# Patient Record
Sex: FEMALE | Race: WHITE | Employment: OTHER | URBAN - METROPOLITAN AREA
[De-identification: names, ages, dates, MRNs, and addresses within clinical notes are randomized per-mention and may not be internally consistent; named-entity substitution may affect disease eponyms.]

---

## 2017-03-24 PROBLEM — R20.0 ARM NUMBNESS: Status: ACTIVE | Noted: 2017-03-24

## 2020-03-06 ENCOUNTER — APPOINTMENT (OUTPATIENT)
Dept: CT IMAGING | Age: 55
End: 2020-03-06
Payer: COMMERCIAL

## 2020-03-06 ENCOUNTER — HOSPITAL ENCOUNTER (EMERGENCY)
Age: 55
Discharge: HOME | End: 2020-03-06
Attending: EMERGENCY MEDICINE
Payer: COMMERCIAL

## 2020-03-06 ENCOUNTER — APPOINTMENT (OUTPATIENT)
Dept: GENERAL RADIOLOGY | Age: 55
End: 2020-03-06
Payer: COMMERCIAL

## 2020-03-06 ENCOUNTER — APPOINTMENT (OUTPATIENT)
Dept: NUCLEAR MEDICINE | Age: 55
End: 2020-03-06
Payer: COMMERCIAL

## 2020-03-06 ENCOUNTER — APPOINTMENT (OUTPATIENT)
Dept: NON INVASIVE DIAGNOSTICS | Age: 55
End: 2020-03-06
Payer: COMMERCIAL

## 2020-03-06 VITALS
TEMPERATURE: 97.9 F | DIASTOLIC BLOOD PRESSURE: 70 MMHG | SYSTOLIC BLOOD PRESSURE: 113 MMHG | HEIGHT: 61 IN | HEART RATE: 70 BPM | RESPIRATION RATE: 16 BRPM | OXYGEN SATURATION: 95 % | WEIGHT: 140 LBS | BODY MASS INDEX: 26.43 KG/M2

## 2020-03-06 LAB
ALBUMIN SERPL-MCNC: 4.3 G/DL (ref 3.5–5.2)
ALP BLD-CCNC: 74 U/L (ref 35–104)
ALT SERPL-CCNC: 31 U/L (ref 0–32)
ANION GAP SERPL CALCULATED.3IONS-SCNC: 12 MMOL/L (ref 7–16)
ANISOCYTOSIS: NORMAL
AST SERPL-CCNC: 23 U/L (ref 0–31)
ATYPICAL LYMPHOCYTE RELATIVE PERCENT: 2 % (ref 0–4)
BASOPHILS ABSOLUTE: 0 E9/L (ref 0–0.2)
BASOPHILS RELATIVE PERCENT: 0 % (ref 0–2)
BILIRUB SERPL-MCNC: 0.3 MG/DL (ref 0–1.2)
BUN BLDV-MCNC: 15 MG/DL (ref 6–20)
CALCIUM SERPL-MCNC: 9 MG/DL (ref 8.6–10.2)
CHLORIDE BLD-SCNC: 103 MMOL/L (ref 98–107)
CHOLESTEROL, TOTAL: 140 MG/DL (ref 0–199)
CO2: 25 MMOL/L (ref 22–29)
CREAT SERPL-MCNC: 0.7 MG/DL (ref 0.5–1)
D DIMER: 777 NG/ML DDU
EKG ATRIAL RATE: 59 BPM
EKG ATRIAL RATE: 64 BPM
EKG P AXIS: 41 DEGREES
EKG P AXIS: 53 DEGREES
EKG P-R INTERVAL: 122 MS
EKG P-R INTERVAL: 126 MS
EKG Q-T INTERVAL: 414 MS
EKG Q-T INTERVAL: 430 MS
EKG QRS DURATION: 84 MS
EKG QRS DURATION: 92 MS
EKG QTC CALCULATION (BAZETT): 425 MS
EKG QTC CALCULATION (BAZETT): 427 MS
EKG R AXIS: 25 DEGREES
EKG R AXIS: 49 DEGREES
EKG T AXIS: 11 DEGREES
EKG T AXIS: 32 DEGREES
EKG VENTRICULAR RATE: 59 BPM
EKG VENTRICULAR RATE: 64 BPM
EOSINOPHILS ABSOLUTE: 0.31 E9/L (ref 0.05–0.5)
EOSINOPHILS RELATIVE PERCENT: 5 % (ref 0–6)
GFR AFRICAN AMERICAN: >60
GFR NON-AFRICAN AMERICAN: >60 ML/MIN/1.73
GLUCOSE BLD-MCNC: 118 MG/DL (ref 74–99)
HCT VFR BLD CALC: 35.2 % (ref 34–48)
HDLC SERPL-MCNC: 51 MG/DL
HEMOGLOBIN: 11.6 G/DL (ref 11.5–15.5)
LDL CHOLESTEROL CALCULATED: 70 MG/DL (ref 0–99)
LV EF: 80 %
LVEF MODALITY: NORMAL
LYMPHOCYTES ABSOLUTE: 1.8 E9/L (ref 1.5–4)
LYMPHOCYTES RELATIVE PERCENT: 27 % (ref 20–42)
MCH RBC QN AUTO: 28.8 PG (ref 26–35)
MCHC RBC AUTO-ENTMCNC: 33 % (ref 32–34.5)
MCV RBC AUTO: 87.3 FL (ref 80–99.9)
METAMYELOCYTES RELATIVE PERCENT: 1 % (ref 0–1)
MONOCYTES ABSOLUTE: 0.37 E9/L (ref 0.1–0.95)
MONOCYTES RELATIVE PERCENT: 6 % (ref 2–12)
NEUTROPHILS ABSOLUTE: 3.72 E9/L (ref 1.8–7.3)
NEUTROPHILS RELATIVE PERCENT: 59 % (ref 43–80)
PDW BLD-RTO: 13.5 FL (ref 11.5–15)
PLATELET # BLD: 190 E9/L (ref 130–450)
PMV BLD AUTO: 11.3 FL (ref 7–12)
POTASSIUM REFLEX MAGNESIUM: 3.7 MMOL/L (ref 3.5–5)
RBC # BLD: 4.03 E12/L (ref 3.5–5.5)
SODIUM BLD-SCNC: 140 MMOL/L (ref 132–146)
TOTAL PROTEIN: 7.5 G/DL (ref 6.4–8.3)
TRIGL SERPL-MCNC: 96 MG/DL (ref 0–149)
TROPONIN: <0.01 NG/ML (ref 0–0.03)
TROPONIN: <0.01 NG/ML (ref 0–0.03)
VLDLC SERPL CALC-MCNC: 19 MG/DL
WBC # BLD: 6.2 E9/L (ref 4.5–11.5)

## 2020-03-06 PROCEDURE — 3430000000 HC RX DIAGNOSTIC RADIOPHARMACEUTICAL: Performed by: RADIOLOGY

## 2020-03-06 PROCEDURE — 84484 ASSAY OF TROPONIN QUANT: CPT

## 2020-03-06 PROCEDURE — A9500 TC99M SESTAMIBI: HCPCS | Performed by: RADIOLOGY

## 2020-03-06 PROCEDURE — 96375 TX/PRO/DX INJ NEW DRUG ADDON: CPT

## 2020-03-06 PROCEDURE — 93005 ELECTROCARDIOGRAM TRACING: CPT | Performed by: EMERGENCY MEDICINE

## 2020-03-06 PROCEDURE — 85025 COMPLETE CBC W/AUTO DIFF WBC: CPT

## 2020-03-06 PROCEDURE — 6360000002 HC RX W HCPCS: Performed by: EMERGENCY MEDICINE

## 2020-03-06 PROCEDURE — 85378 FIBRIN DEGRADE SEMIQUANT: CPT

## 2020-03-06 PROCEDURE — 93016 CV STRESS TEST SUPVJ ONLY: CPT | Performed by: INTERNAL MEDICINE

## 2020-03-06 PROCEDURE — 73030 X-RAY EXAM OF SHOULDER: CPT

## 2020-03-06 PROCEDURE — 71045 X-RAY EXAM CHEST 1 VIEW: CPT

## 2020-03-06 PROCEDURE — 99245 OFF/OP CONSLTJ NEW/EST HI 55: CPT | Performed by: INTERNAL MEDICINE

## 2020-03-06 PROCEDURE — 80053 COMPREHEN METABOLIC PANEL: CPT

## 2020-03-06 PROCEDURE — 78452 HT MUSCLE IMAGE SPECT MULT: CPT

## 2020-03-06 PROCEDURE — APPSS60 APP SPLIT SHARED TIME 46-60 MINUTES: Performed by: NURSE PRACTITIONER

## 2020-03-06 PROCEDURE — 99284 EMERGENCY DEPT VISIT MOD MDM: CPT

## 2020-03-06 PROCEDURE — 96374 THER/PROPH/DIAG INJ IV PUSH: CPT

## 2020-03-06 PROCEDURE — 80061 LIPID PANEL: CPT

## 2020-03-06 PROCEDURE — 71275 CT ANGIOGRAPHY CHEST: CPT

## 2020-03-06 PROCEDURE — 6360000004 HC RX CONTRAST MEDICATION: Performed by: RADIOLOGY

## 2020-03-06 PROCEDURE — 93018 CV STRESS TEST I&R ONLY: CPT | Performed by: INTERNAL MEDICINE

## 2020-03-06 PROCEDURE — 6370000000 HC RX 637 (ALT 250 FOR IP): Performed by: EMERGENCY MEDICINE

## 2020-03-06 PROCEDURE — 93017 CV STRESS TEST TRACING ONLY: CPT

## 2020-03-06 RX ORDER — ASPIRIN 325 MG
325 TABLET ORAL ONCE
Status: COMPLETED | OUTPATIENT
Start: 2020-03-06 | End: 2020-03-06

## 2020-03-06 RX ORDER — KETOROLAC TROMETHAMINE 30 MG/ML
15 INJECTION, SOLUTION INTRAMUSCULAR; INTRAVENOUS ONCE
Status: COMPLETED | OUTPATIENT
Start: 2020-03-06 | End: 2020-03-06

## 2020-03-06 RX ORDER — FENTANYL CITRATE 50 UG/ML
25 INJECTION, SOLUTION INTRAMUSCULAR; INTRAVENOUS ONCE
Status: COMPLETED | OUTPATIENT
Start: 2020-03-06 | End: 2020-03-06

## 2020-03-06 RX ADMIN — Medication 30 MILLICURIE: at 13:36

## 2020-03-06 RX ADMIN — FENTANYL CITRATE 25 MCG: 50 INJECTION, SOLUTION INTRAMUSCULAR; INTRAVENOUS at 07:05

## 2020-03-06 RX ADMIN — KETOROLAC TROMETHAMINE 15 MG: 30 INJECTION, SOLUTION INTRAMUSCULAR; INTRAVENOUS at 06:25

## 2020-03-06 RX ADMIN — IOPAMIDOL 80 ML: 755 INJECTION, SOLUTION INTRAVENOUS at 07:57

## 2020-03-06 RX ADMIN — ASPIRIN 325 MG: 325 TABLET, FILM COATED ORAL at 06:12

## 2020-03-06 RX ADMIN — Medication 10 MILLICURIE: at 13:36

## 2020-03-06 ASSESSMENT — PAIN SCALES - GENERAL
PAINLEVEL_OUTOF10: 5
PAINLEVEL_OUTOF10: 6
PAINLEVEL_OUTOF10: 7

## 2020-03-06 ASSESSMENT — PAIN DESCRIPTION - DESCRIPTORS: DESCRIPTORS: ACHING;SPASM;STABBING

## 2020-03-06 ASSESSMENT — PAIN DESCRIPTION - ONSET: ONSET: PROGRESSIVE

## 2020-03-06 ASSESSMENT — ENCOUNTER SYMPTOMS
WHEEZING: 0
EYE PAIN: 0
SORE THROAT: 0
ABDOMINAL PAIN: 0
VOMITING: 0
NAUSEA: 0
COUGH: 0
CHEST TIGHTNESS: 0
SHORTNESS OF BREATH: 0
CONSTIPATION: 0
COLOR CHANGE: 0
BACK PAIN: 0
DIARRHEA: 0

## 2020-03-06 ASSESSMENT — PAIN DESCRIPTION - PAIN TYPE: TYPE: ACUTE PAIN

## 2020-03-06 ASSESSMENT — PAIN DESCRIPTION - ORIENTATION: ORIENTATION: RIGHT

## 2020-03-06 ASSESSMENT — PAIN DESCRIPTION - LOCATION: LOCATION: SHOULDER

## 2020-03-06 ASSESSMENT — PAIN DESCRIPTION - FREQUENCY: FREQUENCY: CONTINUOUS

## 2020-03-06 NOTE — ED PROVIDER NOTES
Oropharynx is clear. Eyes:      General: Lids are normal.      Extraocular Movements: Extraocular movements intact. Conjunctiva/sclera: Conjunctivae normal.   Neck:      Musculoskeletal: Normal range of motion and neck supple. Trachea: Trachea and phonation normal.   Cardiovascular:      Rate and Rhythm: Normal rate and regular rhythm. Heart sounds: Normal heart sounds. Pulmonary:      Effort: Pulmonary effort is normal.      Breath sounds: Normal breath sounds and air entry. Abdominal:      General: Abdomen is flat. Palpations: Abdomen is soft. Tenderness: There is no abdominal tenderness. There is no guarding or rebound. Musculoskeletal:      Right shoulder: She exhibits pain. Comments: Patient is complaining of right-sided shoulder and back pain, however does not reproducible to palpation. Skin:     General: Skin is warm and dry. Neurological:      General: No focal deficit present. Mental Status: She is alert and oriented to person, place, and time. GCS: GCS eye subscore is 4. GCS verbal subscore is 5. GCS motor subscore is 6. Psychiatric:         Attention and Perception: Attention and perception normal.         Mood and Affect: Mood and affect normal.         Speech: Speech normal.         Behavior: Behavior normal. Behavior is cooperative. Thought Content: Thought content normal.         Cognition and Memory: Cognition and memory normal.          Procedures     MDM  Number of Diagnoses or Management Options  Acute pain of right shoulder:   Chest pain, unspecified type:   Treadmill stress test negative for angina pectoris:   Diagnosis management comments: Patient's lab work within normal limits, however patient's EKG has some new T wave inversions in lead V2 relative to her prior. Spoke to Haley Arteaga came to the ED and evaluated patient. Patient has negative delta troponin, patient had negative stress test on as well.   Patient discharged home Protein 7.5 6.4 - 8.3 g/dL    Alb 4.3 3.5 - 5.2 g/dL    Total Bilirubin 0.3 0.0 - 1.2 mg/dL    Alkaline Phosphatase 74 35 - 104 U/L    ALT 31 0 - 32 U/L    AST 23 0 - 31 U/L   Troponin   Result Value Ref Range    Troponin <0.01 0.00 - 0.03 ng/mL   D-Dimer, Quantitative   Result Value Ref Range    D-Dimer, Quant 777 ng/mL DDU   Troponin   Result Value Ref Range    Troponin <0.01 0.00 - 0.03 ng/mL   Lipid Panel   Result Value Ref Range    Cholesterol, Total 140 0 - 199 mg/dL    Triglycerides 96 0 - 149 mg/dL    HDL 51 >40 mg/dL    LDL Calculated 70 0 - 99 mg/dL    VLDL Cholesterol Calculated 19 mg/dL   EKG 12 Lead   Result Value Ref Range    Ventricular Rate 64 BPM    Atrial Rate 64 BPM    P-R Interval 126 ms    QRS Duration 84 ms    Q-T Interval 414 ms    QTc Calculation (Bazett) 427 ms    P Axis 53 degrees    R Axis 49 degrees    T Axis 32 degrees   EKG 12 Lead   Result Value Ref Range    Ventricular Rate 59 BPM    Atrial Rate 59 BPM    P-R Interval 122 ms    QRS Duration 92 ms    Q-T Interval 430 ms    QTc Calculation (Bazett) 425 ms    P Axis 41 degrees    R Axis 25 degrees    T Axis 11 degrees       Radiology:  NM Cardiac Stress Test Nuclear Imaging   Final Result   1. No reversible perfusion defect   2. Ejection fraction is 80 %. 3. No significant wall motion abnormality         XR CHEST PORTABLE   Final Result   Normal chest               XR SHOULDER RIGHT (MIN 2 VIEWS)   Final Result   Nondisplaced fracture right clavicle. CTA PULMONARY W CONTRAST   Final Result      1. Negative for PE or dissection. 2. Slight dependent atelectasis at the lung bases, no definite   pneumonia. 3. Mid thoracic degeneration.                   ------------------------- NURSING NOTES AND VITALS REVIEWED ---------------------------  Date / Time Roomed:  3/6/2020  5:17 AM  ED Bed Assignment:  21/21    The nursing notes within the ED encounter and vital signs as below have been reviewed.    /70   Pulse 70 Temp 97.9 °F (36.6 °C) (Oral)   Resp 16   Ht 5' 1\" (1.549 m)   Wt 140 lb (63.5 kg)   LMP 09/08/2015   SpO2 95%   BMI 26.45 kg/m²   Oxygen Saturation Interpretation: Normal      ------------------------------------------ PROGRESS NOTES ------------------------------------------  3:15 PM  I have spoken with the patient and discussed todays results, in addition to providing specific details for the plan of care and counseling regarding the diagnosis and prognosis. Their questions are answered at this time and they are agreeable with the plan. I discussed at length with them reasons for immediate return here for re evaluation. They will followup with their primary care physician by calling their office on Monday.      --------------------------------- ADDITIONAL PROVIDER NOTES ---------------------------------  At this time the patient is without objective evidence of an acute process requiring hospitalization or inpatient management. They have remained hemodynamically stable throughout their entire ED visit and are stable for discharge with outpatient follow-up. The plan has been discussed in detail and they are aware of the specific conditions for emergent return, as well as the importance of follow-up. New Prescriptions    No medications on file       Diagnosis:  1. Acute pain of right shoulder    2. Chest pain, unspecified type    3. Treadmill stress test negative for angina pectoris        Disposition:  Patient's disposition: Discharge to home  Patient's condition is stable.        Prabhjot Bilsl DO  Resident  03/06/20 [id number]

## 2020-03-06 NOTE — CONSULTS
Inpatient Cardiology Consultation      Reason for Consult:  EKG changes     Consulting Physician: Dr. Srinivasan Herrera    Requesting Physician:  Dr. Dianne Lyman     Date of Consultation: 3/6/2020    HISTORY OF PRESENT ILLNESS:   Ms. Wilfredo Cruz is a 54year old  female who is previously not known to [facility]) Cardiology Physicians in [facility]. Her medical history includes HLD, right arm carpal tunnel, and family Hx of premature CAD. Ms. Wilfredo Cruz presented to [facility] ED on 03/06/2020 with complaints of right shoulder and upper mid back pain. She states that prior to presentation over the past 2 days she had a \"low grade fever and headache\". 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 She noticed a red rash on 03/05/2020 and went to see her PCP () and received a steroid injection. She states that her rash was gone by this morning. She states that on 03/05/2020 she was \"moving and lifting a lot\". She denies injury or fall. States that she went to bed without pain or discomfort. States that on 03/06/2020 she woke at 3:45 with severe right shoulder pain that was improved with ambulation and she denies accompanying chest pain and dyspnea. She states that her right shoulder pain persisted and worsened with lying down and sitting. Subsequently she presented to the ED for further evaluation. Upon arrival to the ED her VS were 98.3-82-/72-97% on RA. EKG SR with nonspecific ST-T wave abnormality (as interpreted by Dr. Srinivasan Herrera). Troponin <0.01. BC 6.2. H&H 11.6/35.2. BUN/SCr 15/0.7. CTA of the chest was negative for pulmonary emboli, with atelectasis and mid thoracic degeneration. XRay of the right shoulder with a nondisplaced fracture right clavicle. She states that her right shoulder pain began to radiate to her upper mid back. She received ASA 324mg, Toradol 15mg and Fentanyl 25mcg.  She states that her right shoulder pain was alleviated and her back pain improved. Repeat EKG with SR and no acute changes. Cardiology was consulted by Dr. Darryl Joseph for management of EKG changes. Please note: past medical records were reviewed per electronic medical record (EMR) - see detailed reports under Past Medical/ Surgical History. Past Medical and Surgical History:    1. HLD  2. Right Carpal Tunnel  3. Family Hx of Premature CAD  4. S/p Nasal septum surgery and tracheostomy at age 7 months. Medications Prior to admit:  Prior to Admission medications    Medication Sig Start Date End Date Taking? Authorizing Provider   vitamin D (CHOLECALCIFEROL) 1000 UNIT TABS tablet Take 1,000 Units by mouth daily   Yes Historical Provider, MD   atorvastatin (LIPITOR) 10 MG tablet Take 10 mg by mouth daily. Yes Historical Provider, MD   Calcium-Vitamin D-Vitamin K (VIACTIV PO) Take  by mouth. Yes Historical Provider, MD       Current Medications:    No current facility-administered medications for this encounter. Allergies:  Cephalosporins; Penicillins; and Sulfa antibiotics    Social History:    Denies tobacco and illicit drug use. Drinks alcohol on rare occasions. Denies caffeine intake     Family History:   Father with initial MI at 48. Mother s/p CABG/VHD (specifics unknown) at age 58. REVIEW OF SYSTEMS:     · Constitutional: Denies chills, night sweats, and fatigue. Complains of low grade fevers  · HEENT: Denies nose bleeds, and blurred vision,oral pain, abscess or lesion. Complains of headaches   · Musculoskeletal: Denies falls, pain to BLE with ambulation and edema to BLE. Complains of right shoulder and mid back pain. · Neurological: Denies dizziness and lightheadedness, numbness and tingling  · Cardiovascular: Denies chest pain, palpitations, and feelings of heart racing. · Respiratory: Denies orthopnea and PND  · Gastrointestinal: Denies heartburn, nausea/vomiting, diarrhea and constipation, black/bloody, and tarry stools. 190     BMP:   Recent Labs     03/06/20 0618      K 3.7   CO2 25   BUN 15   CREATININE 0.7   LABGLOM >60   CALCIUM 9.0   CARDIAC ENZYMES:  Recent Labs     03/06/20 0618   TROPONINI <0.01   LIVER PROFILE:  Recent Labs     03/06/20 0618   AST 23   ALT 31   LABALBU 4.3     03/06/2020 CTA of Chest:   1. Negative for PE or dissection. 2. Slight dependent atelectasis at the lung bases, no definite  pneumonia. 3. Mid thoracic degeneration.     03/06/2020 CXR:   Normal chest    03/06/2020 Right Shoulder XRay:   Nondisplaced fracture right clavicle. IMPRESSION and PLAN to follow as per Dr. Olvin Sadler    Electronically signed by REX Charles CNP on 3/6/2020 at 10:07 AM   ______________________________________________________________________  Cardiology attending attestation:  I have independently interviewed and examined the patient. I personally reviewed pertinent laboratory values and diagnostic testing (including, if applicable, chest xray, electrocardiogram, telemetry, echocardiogram, stress testing, and coronary angiography). I have reviewed the above documentation completed by REX Charles CNP including past medical, social, and family history and agree with the findings, assessment and plan except where noted. Plan formulated under my direct supervision. I participated in all aspects of the medical decision making. Please see my additional contributions to the HPI, physical exam, and assessment / medical decision making:  _______________________________________________________________________    Patient with no prior cardiac disease, but has family history of premature CAD and hyperlipidemia presenting with atypical right shoulder and arm pain, after moving some heavy stuff around the house all day yesterday. Initially was sharp and felt throbbing and radiating up and down the right arm. Also some radiation to the back right shoulder. EKG showed nonspecific anterior T wave inversions. Troponin negative x2. Currently complaining of some residual right back shoulder pain and little bit of right arm pain. Shoulder x-ray suggested a nondisplaced right clavicular fracture. She is normally active and tries to get 10,000 steps in per day. 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 No prior cardiac ischemic evaluation. Physical Exam:  /80   Pulse 57   Temp 98.3 °F (36.8 °C) (Oral)   Resp 14   Ht 5' 1\" (1.549 m)   Wt 140 lb (63.5 kg)   LMP 09/08/2015   SpO2 95%   BMI 26.45 kg/m²   General appearance: Well-appearing middle-aged female, awake, alert, no acute respiratory distress  Skin: Intact, no rash  ENMT: Moist mucous membranes  Neck: Supple, no carotid bruits. Normal jugular venous pressure  Lungs: Clear to auscultation bilaterally. No wheezes, rales, or rhonchi  Cardiac: Regular rhythm with a normal rate, normal S1&S2, no murmurs apparent  Abdomen: Soft, positive bowel sounds, nontender  Extremities: Moves all extremities x 4, no lower extremity edema  Neurologic: No focal motor deficits apparent, normal mood and affect    EKG: Sinus bradycardia 59 bpm.  Normal axis normal intervals. Nonspecific T wave inversions noted inferior and V2 to V3. Review of prior EKG from  January 2020 showed nonspecific T wave changes in V2 but appear more pronounced now. Impression:   1. Typical chest pain with nonspecific anterior T wave changes. Troponin negative x2. Patient with multiple cardiac risk factors. 2. Hyperlipidemia  3. Family history premature CAD  4. Comorbid disease: Possible nondisplaced right clavicular fracture, right carpal tunnel, history of nasal septal surgery and tracheostomy at 10months of age    Recommendations:  Low suspicion for ischemic etiology of her pain, but given her risk factors along with a nonspecific EKG changes I do think ischemic evaluation is warranted.      Exercise nuclear stress test today   If no significant ischemia, okay for discharge   Aggressive risk factor modification   I can follow-up with her in the office    Thank you for the consultation. Please do not hesitate to call with questions.     Karen Roldan MD  [facility]) Cardiology

## 2020-03-06 NOTE — ED NOTES
Bed: 21  Expected date:   Expected time:   Means of arrival:   Comments:  kiara Marte RN  03/06/20 [id number]

## 2020-03-10 ENCOUNTER — TELEPHONE (OUTPATIENT)
Dept: CARDIOLOGY CLINIC | Age: 55
End: 2020-03-10

## 2020-07-09 ENCOUNTER — HOSPITAL ENCOUNTER (EMERGENCY)
Age: 55
Discharge: HOME | End: 2020-07-09
Payer: COMMERCIAL

## 2020-07-09 VITALS
BODY MASS INDEX: 26.06 KG/M2 | DIASTOLIC BLOOD PRESSURE: 69 MMHG | TEMPERATURE: 98.2 F | HEART RATE: 75 BPM | RESPIRATION RATE: 14 BRPM | WEIGHT: 138 LBS | SYSTOLIC BLOOD PRESSURE: 137 MMHG | OXYGEN SATURATION: 97 % | HEIGHT: 61 IN

## 2020-07-09 PROCEDURE — 12002 RPR S/N/AX/GEN/TRNK2.6-7.5CM: CPT

## 2020-07-09 PROCEDURE — 99282 EMERGENCY DEPT VISIT SF MDM: CPT

## 2020-07-09 RX ORDER — LIDOCAINE HYDROCHLORIDE 10 MG/ML
20 INJECTION, SOLUTION INFILTRATION; PERINEURAL ONCE
Status: DISCONTINUED | OUTPATIENT
Start: 2020-07-09 | End: 2020-07-09 | Stop reason: HOSPADM

## 2020-07-09 ASSESSMENT — PAIN DESCRIPTION - LOCATION: LOCATION: ARM

## 2020-07-09 ASSESSMENT — PAIN DESCRIPTION - PROGRESSION: CLINICAL_PROGRESSION: NOT CHANGED

## 2020-07-09 ASSESSMENT — PAIN SCALES - GENERAL: PAINLEVEL_OUTOF10: 3

## 2020-07-09 ASSESSMENT — PAIN DESCRIPTION - FREQUENCY: FREQUENCY: CONTINUOUS

## 2020-07-09 ASSESSMENT — PAIN DESCRIPTION - PAIN TYPE: TYPE: ACUTE PAIN

## 2020-07-09 ASSESSMENT — PAIN DESCRIPTION - DESCRIPTORS: DESCRIPTORS: PATIENT UNABLE TO DESCRIBE

## 2020-07-09 ASSESSMENT — PAIN DESCRIPTION - ORIENTATION: ORIENTATION: LEFT

## 2020-07-09 NOTE — ED PROVIDER NOTES
EOMI   Neck:  Normal ROM. Supple. Non-tender. Extremities: 4cm laceration with some exposed adipose tissue to anterior left arm- hid humerus, no active bleeding, intact sensations distally, full ROM of affected extremity    Lymphatics: No lymphangitis or adenopathy noted. Neurological: CN II-XII grossly intact, Motor functions intact. Lab / Imaging Results   (All laboratory and radiology results have been personally reviewed by myself)  Labs:  No results found for this visit on 07/09/20. Imaging: All Radiology results interpreted by Radiologist unless otherwise noted. No orders to display     ED Course / Medical Decision Making     Medications   Tetanus-Diphth-Acell Pertussis (BOOSTRIX) injection 0.5 mL (has no administration in time range)   lidocaine 1 % injection 20 mL (has no administration in time range)     Consult(s):  None    Procedure(s):  PROCEDURE NOTE      LACERATION REPAIR  Risks, benefits and alternatives (for applicable procedures below) described. Performed By: Dedirck Franklin PA-C. Laceration #: 1. Location: anterior mid-humerus of left arm   Length: 4cm. The wound area was irrigated with sterile saline, cleansed with povidone iodine, cleansend with shur-clens and draped in a sterile fashion. Local Anesthesia:  Lidocaine 1% without epinephrine. The wound was explored with the following results:  no foreign body or tendon injury seen. Flaps Aligned: yes. The wound was closed with 5-0 Ethilon using interrupted sutures. Dressing:  a sterile dressing and a bandage was placed. Total number suture:  9. There were no additional lacerations requiring repair. MDM:     Counseling: The emergency provider has spoken with the patient/caregiver and discussed todays results, in addition to providing specific details for the plan of care and counseling regarding the diagnosis and prognosis. Questions are answered at this time and they are agreeable with the plan.   Pt was educated on signs of infection and wound care. Pt was advised to return to ED if signs of infection or fever develop. Pt understands they must follow-up with PCP for suture removal and/or wound check. Pt remained nontoxic, afebrile, and A&O x3 during this ED visit. They agreed with plan of care, discharge, and importance of follow-up. Pt was in no distress at discharge. Vitals stable. All results reviewed with pt and all questions answered. Patient was educated on newly prescribed medication. Patient educated on wound care. Assessment     1. Arm laceration, left, initial encounter      Plan   Discharge to home  Patient condition is good    New Medications     Discharge Medication List as of 7/9/2020 12:23 PM        Electronically signed by Abby Villaseñor PA-C   DD: 7/9/20  **This report was transcribed using voice recognition software. Every effort was made to ensure accuracy; however, inadvertent computerized transcription errors may be present.     END OF ED PROVIDER NOTE        Abby Villaseñor PA-C  07/09/20 [id number]

## 2023-04-18 ENCOUNTER — OFFICE VISIT (OUTPATIENT)
Dept: OBGYN | Age: 58
End: 2023-04-18
Payer: COMMERCIAL

## 2023-04-18 VITALS
BODY MASS INDEX: 27.56 KG/M2 | WEIGHT: 146 LBS | SYSTOLIC BLOOD PRESSURE: 121 MMHG | HEIGHT: 61 IN | HEART RATE: 54 BPM | DIASTOLIC BLOOD PRESSURE: 75 MMHG

## 2023-04-18 DIAGNOSIS — Z12.31 ENCOUNTER FOR SCREENING MAMMOGRAM FOR MALIGNANT NEOPLASM OF BREAST: ICD-10-CM

## 2023-04-18 DIAGNOSIS — Z01.419 ENCOUNTER FOR ANNUAL ROUTINE GYNECOLOGICAL EXAMINATION: Primary | ICD-10-CM

## 2023-04-18 PROBLEM — R20.0 ARM NUMBNESS: Status: RESOLVED | Noted: 2017-03-24 | Resolved: 2023-04-18

## 2023-04-18 PROCEDURE — 99396 PREV VISIT EST AGE 40-64: CPT | Performed by: OBSTETRICS & GYNECOLOGY

## 2023-04-18 PROCEDURE — 99203 OFFICE O/P NEW LOW 30 MIN: CPT | Performed by: OBSTETRICS & GYNECOLOGY

## 2023-04-18 SDOH — ECONOMIC STABILITY: HOUSING INSECURITY
IN THE LAST 12 MONTHS, WAS THERE A TIME WHEN YOU DID NOT HAVE A STEADY PLACE TO SLEEP OR SLEPT IN A SHELTER (INCLUDING NOW)?: NO

## 2023-04-18 SDOH — ECONOMIC STABILITY: FOOD INSECURITY: WITHIN THE PAST 12 MONTHS, YOU WORRIED THAT YOUR FOOD WOULD RUN OUT BEFORE YOU GOT MONEY TO BUY MORE.: NEVER TRUE

## 2023-04-18 SDOH — ECONOMIC STABILITY: INCOME INSECURITY: HOW HARD IS IT FOR YOU TO PAY FOR THE VERY BASICS LIKE FOOD, HOUSING, MEDICAL CARE, AND HEATING?: NOT HARD AT ALL

## 2023-04-18 SDOH — ECONOMIC STABILITY: FOOD INSECURITY: WITHIN THE PAST 12 MONTHS, THE FOOD YOU BOUGHT JUST DIDN'T LAST AND YOU DIDN'T HAVE MONEY TO GET MORE.: NEVER TRUE

## 2023-04-18 ASSESSMENT — PATIENT HEALTH QUESTIONNAIRE - PHQ9
SUM OF ALL RESPONSES TO PHQ QUESTIONS 1-9: 0
1. LITTLE INTEREST OR PLEASURE IN DOING THINGS: 0
SUM OF ALL RESPONSES TO PHQ QUESTIONS 1-9: 0
2. FEELING DOWN, DEPRESSED OR HOPELESS: 0
SUM OF ALL RESPONSES TO PHQ QUESTIONS 1-9: 0
SUM OF ALL RESPONSES TO PHQ QUESTIONS 1-9: 0
SUM OF ALL RESPONSES TO PHQ9 QUESTIONS 1 & 2: 0

## 2023-04-18 ASSESSMENT — ENCOUNTER SYMPTOMS
VOMITING: 0
ABDOMINAL PAIN: 0
WHEEZING: 0
CONSTIPATION: 0
DIARRHEA: 0
COUGH: 0
NAUSEA: 0
BLOOD IN STOOL: 0
SHORTNESS OF BREATH: 0

## 2023-04-18 NOTE — PROGRESS NOTES
Np/annual.  Pt denies abnormal mammogram or pap. Pt denies pain or discomfort with intercourse. Pt had last mammogram 7/202022 wnl. Pt has hx of dense breasts.   Pt last pap was 2/9/2018 neg pap neg hpv
Mental Status: She is alert. Psychiatric:         Mood and Affect: Mood normal.        Zaynab Goncalves was seen today for new patient. Diagnoses and all orders for this visit:    Encounter for annual routine gynecological examination  -     PAP SMEAR    Encounter for screening mammogram for malignant neoplasm of breast  -     Cancel: HA DIGITAL SCREEN W OR WO CAD BILATERAL; Future  -     HA DIGITAL SCREEN W OR WO CAD BILATERAL; Future      She is up-to-date on all of her screenings. We did review when to call for PMB. Reviewed hot flashes night sweats and vaginal dryness. And options for management. Encouraged continued calcium vitamin D and weightbearing exercise.     Return in about 1 year (around 4/18/2024) for annual.     Delmis Mackenzie, DO

## 2023-04-19 LAB
HPV SAMPLE: NORMAL
SPECIMEN SOURCE: NORMAL

## 2023-04-27 LAB
HPV HR 12 DNA SPEC QL NAA+PROBE: NOT DETECTED
HPV SAMPLE: NORMAL
HPV16 DNA SPEC QL NAA+PROBE: NOT DETECTED
HPV16+18+H RISK 12 DNA CVX-IMP: NORMAL
HPV18 DNA SPEC QL NAA+PROBE: NOT DETECTED
SPECIMEN SOURCE: NORMAL

## 2024-07-23 ENCOUNTER — OFFICE VISIT (OUTPATIENT)
Dept: OBGYN | Age: 59
End: 2024-07-23
Payer: COMMERCIAL

## 2024-07-23 VITALS
BODY MASS INDEX: 27.56 KG/M2 | WEIGHT: 146 LBS | DIASTOLIC BLOOD PRESSURE: 75 MMHG | HEIGHT: 61 IN | SYSTOLIC BLOOD PRESSURE: 115 MMHG | HEART RATE: 59 BPM

## 2024-07-23 DIAGNOSIS — Z01.419 ENCOUNTER FOR ANNUAL ROUTINE GYNECOLOGICAL EXAMINATION: Primary | ICD-10-CM

## 2024-07-23 DIAGNOSIS — Z12.31 ENCOUNTER FOR SCREENING MAMMOGRAM FOR MALIGNANT NEOPLASM OF BREAST: ICD-10-CM

## 2024-07-23 PROCEDURE — 99396 PREV VISIT EST AGE 40-64: CPT | Performed by: OBSTETRICS & GYNECOLOGY

## 2024-07-23 PROCEDURE — 99213 OFFICE O/P EST LOW 20 MIN: CPT | Performed by: OBSTETRICS & GYNECOLOGY

## 2024-07-23 ASSESSMENT — ENCOUNTER SYMPTOMS
BLOOD IN STOOL: 0
ABDOMINAL PAIN: 0
WHEEZING: 0
CONSTIPATION: 0
DIARRHEA: 0
COUGH: 0
VOMITING: 0
SHORTNESS OF BREATH: 0
NAUSEA: 0

## 2024-07-23 NOTE — PROGRESS NOTES
Dave Irene is a 59-year-old G2, P2 female whose LMP was late 40s with no PMB.  Pregnancy is uncomplicated.  She and her partner are sexually active no dyspareunia.  Reports no change in PMH, PSH, FH or allergy history.  She denies anxiety, depression, self-harm or suicidal ideation.  No past or present history of physical sexual or verbal abuse  Date of last Cervical Cancer screen (HPV or PAP): 4/18/2023   Mammogram 1- normal  Herb Breast Cancer Risk: Herb: 6.01 %      Colonoscopy age 52 back in 10    Patient presents for annual exam.     Past Medical History:   Diagnosis Date    High cholesterol         Past Surgical History:   Procedure Laterality Date    NASAL SEPTUM SURGERY      TRACHEOTOMY      as child        Family History   Problem Relation Age of Onset    Heart Disease Mother     High Cholesterol Father     Heart Disease Father     Kidney Disease Father     Hypertension Maternal Grandmother     Heart Disease Paternal Grandfather           Current Outpatient Medications:     vitamin D (CHOLECALCIFEROL) 1000 UNIT TABS tablet, Take 1 tablet by mouth daily, Disp: , Rfl:     atorvastatin (LIPITOR) 10 MG tablet, Take 1 tablet by mouth daily, Disp: , Rfl:      Allergies   Allergen Reactions    Cephalosporins Shortness Of Breath    Penicillins Shortness Of Breath    Sulfa Antibiotics Rash        Social History       Tobacco History       Smoking Status  Never      Smokeless Tobacco Use  Never              Alcohol History       Alcohol Use Status  Yes Comment  social              Drug Use       Drug Use Status  No              Sexual Activity       Sexually Active  Yes Partners  Male                     Review of Systems   Constitutional:  Negative for fever.   HENT:  Negative for hearing loss.    Eyes:  Negative for visual disturbance.   Respiratory:  Negative for cough, shortness of breath and wheezing.    Cardiovascular:  Negative for chest pain and palpitations.   Gastrointestinal:

## 2024-08-23 ENCOUNTER — TELEPHONE (OUTPATIENT)
Dept: OBGYN CLINIC | Age: 59
End: 2024-08-23

## 2024-08-23 NOTE — TELEPHONE ENCOUNTER
At her last appointment (7/23/24) you asked her if she had experienced any spotting and her answer was \"no\".      Approximately 2 weeks later she is experiencing spotting after intercourse.  Last Saturday and it started again today.    Patient is concerned and would like a call back.  [facility] and that you were out of office until Tuesday.

## 2024-08-29 ENCOUNTER — OFFICE VISIT (OUTPATIENT)
Age: 59
End: 2024-08-29
Payer: COMMERCIAL

## 2024-08-29 VITALS
HEART RATE: 52 BPM | OXYGEN SATURATION: 97 % | WEIGHT: 147.6 LBS | SYSTOLIC BLOOD PRESSURE: 125 MMHG | DIASTOLIC BLOOD PRESSURE: 78 MMHG | BODY MASS INDEX: 27.89 KG/M2

## 2024-08-29 DIAGNOSIS — N93.0 POSTCOITAL BLEEDING: Primary | ICD-10-CM

## 2024-08-29 PROCEDURE — 99213 OFFICE O/P EST LOW 20 MIN: CPT | Performed by: OBSTETRICS & GYNECOLOGY

## 2024-08-29 NOTE — PROGRESS NOTES
Pt presents for vaginal exam, c/o spotting after intercourse.  Spotting outside of intercourse as well. Pt states no blood will appear until after she goes to the bathroom and wipes

## 2024-08-29 NOTE — PROGRESS NOTES
Dave Irene is a 59-year-old G2, P2 female who was just in the office for her annual exam on 7- and at that time had no vaginal bleeding.  Few weeks ago she had intercourse and then spotted for 2 days after that had intercourse several days later and then 3 days after that had spotting.    Patient presents for postcoital spotting    Past Medical History:   Diagnosis Date    High cholesterol         Past Surgical History:   Procedure Laterality Date    NASAL SEPTUM SURGERY      TRACHEOTOMY      as child        Family History   Problem Relation Age of Onset    Heart Disease Mother     Heart Surgery Mother     High Cholesterol Father     Heart Disease Father     Kidney Disease Father     Heart Surgery Father     Hypertension Maternal Grandmother     Heart Disease Paternal Grandfather         Social History       Tobacco History       Smoking Status  Never      Smokeless Tobacco Use  Never              Alcohol History       Alcohol Use Status  Not Currently Drinks/Week  0 Glasses of wine, 0 Cans of beer, 0 Shots of liquor, 0 Drinks containing 0.5 oz of alcohol per week Comment  social              Drug Use       Drug Use Status  No              Sexual Activity       Sexually Active  Yes Partners  Male                      Current Outpatient Medications:     vitamin D (CHOLECALCIFEROL) 1000 UNIT TABS tablet, Take 1 tablet by mouth daily, Disp: , Rfl:     atorvastatin (LIPITOR) 10 MG tablet, Take 1 tablet by mouth daily, Disp: , Rfl:      Allergies   Allergen Reactions    Cephalosporins Shortness Of Breath    Penicillins Shortness Of Breath    Sulfa Antibiotics Rash        Vitals:    08/29/24 1527   BP: 125/78   Pulse: 52   SpO2: 97%        Physical Exam:  General: Alert and oriented no acute distress.     Pelvic exam: Vulva no lesions l                        Vagina decreased rugae, scant white discharge                        Cervix no ectropion no lesions                       Health tracks culture

## 2024-09-03 ENCOUNTER — TELEPHONE (OUTPATIENT)
Age: 59
End: 2024-09-03

## 2024-09-03 DIAGNOSIS — N93.0 POSTCOITAL BLEEDING: ICD-10-CM

## 2024-09-03 NOTE — TELEPHONE ENCOUNTER
Pt notified and verbalized understanding, pt inquiring if ultrasound should still be preformed. Please advise.

## 2024-09-03 NOTE — TELEPHONE ENCOUNTER
----- Message from Dr. Hayley Burton, DO sent at 9/3/2024 11:13 AM EDT -----  Please let her know that her culture returned positive for bacterial vaginosis this is just an overgrowth of her normal bacteria this can certainly cause spotting as it is an irritant to the cervix and causes inflammation.  I am sending in a prescription for Flagyl twice daily for 7 days

## 2024-09-04 ENCOUNTER — TELEPHONE (OUTPATIENT)
Dept: OBGYN | Age: 59
End: 2024-09-04

## 2024-09-11 ENCOUNTER — HOSPITAL ENCOUNTER (OUTPATIENT)
Dept: ULTRASOUND IMAGING | Age: 59
Discharge: HOME | End: 2024-09-13
Attending: OBSTETRICS & GYNECOLOGY
Payer: COMMERCIAL

## 2024-09-11 DIAGNOSIS — N93.0 POSTCOITAL BLEEDING: ICD-10-CM

## 2024-09-11 PROCEDURE — 76830 TRANSVAGINAL US NON-OB: CPT

## 2024-09-16 ENCOUNTER — TELEPHONE (OUTPATIENT)
Dept: OBGYN | Age: 59
End: 2024-09-16

## 2024-09-16 NOTE — TELEPHONE ENCOUNTER
Patient calling for ultrasound results from 9/11/24. Would like a return call prior to Thursday if possible. Patient does not have a return appt scheduled    Patient phone [phone]

## 2024-09-19 ENCOUNTER — TELEPHONE (OUTPATIENT)
Dept: OBGYN | Age: 59
End: 2024-09-19

## 2024-09-19 NOTE — TELEPHONE ENCOUNTER
Patient notified there are no open appointments. She would like to speak with you regarding the results though prior to alleviate her own mind if possible.

## 2024-09-19 NOTE — TELEPHONE ENCOUNTER
Is it ok for patient to wait to be seen until October 2nd and will it be ok for her to travel the next day if she has a procedure done in the office.

## 2024-09-24 ENCOUNTER — PROCEDURE VISIT (OUTPATIENT)
Dept: OBGYN | Age: 59
End: 2024-09-24
Payer: COMMERCIAL

## 2024-09-24 VITALS
WEIGHT: 146 LBS | DIASTOLIC BLOOD PRESSURE: 69 MMHG | SYSTOLIC BLOOD PRESSURE: 111 MMHG | HEART RATE: 63 BPM | BODY MASS INDEX: 27.59 KG/M2

## 2024-09-24 DIAGNOSIS — N95.0 PMB (POSTMENOPAUSAL BLEEDING): Primary | ICD-10-CM

## 2024-09-24 PROCEDURE — 99214 OFFICE O/P EST MOD 30 MIN: CPT | Performed by: OBSTETRICS & GYNECOLOGY

## 2024-09-24 PROCEDURE — 58100 BIOPSY OF UTERUS LINING: CPT | Performed by: OBSTETRICS & GYNECOLOGY

## 2024-09-27 LAB — SURGICAL PATHOLOGY REPORT: NORMAL

## 2024-10-01 ENCOUNTER — TELEPHONE (OUTPATIENT)
Dept: OBGYN | Age: 59
End: 2024-10-01

## 2024-10-01 NOTE — TELEPHONE ENCOUNTER
I reviewed results of the EMB with Vince the amount of tissue that they see is minimal but they think it could be atrophy however on ultrasound the endometrial thickness was 9 and she presented initially with PMB.  Because there is a discordance would recommend proceeding with hysteroscopy directed biopsy we will schedule a preop appointment

## 2024-10-02 ENCOUNTER — OFFICE VISIT (OUTPATIENT)
Dept: OBGYN | Age: 59
End: 2024-10-02
Payer: COMMERCIAL

## 2024-10-02 VITALS
SYSTOLIC BLOOD PRESSURE: 109 MMHG | HEART RATE: 68 BPM | BODY MASS INDEX: 27.96 KG/M2 | WEIGHT: 148 LBS | DIASTOLIC BLOOD PRESSURE: 69 MMHG

## 2024-10-02 DIAGNOSIS — N95.0 PMB (POSTMENOPAUSAL BLEEDING): Primary | ICD-10-CM

## 2024-10-02 PROCEDURE — 99214 OFFICE O/P EST MOD 30 MIN: CPT | Performed by: OBSTETRICS & GYNECOLOGY

## 2024-10-02 PROCEDURE — 99213 OFFICE O/P EST LOW 20 MIN: CPT

## 2024-10-02 RX ORDER — MISOPROSTOL 100 UG/1
200 TABLET ORAL ONCE
Qty: 2 TABLET | Refills: 0 | Status: SHIPPED | OUTPATIENT
Start: 2024-10-02 | End: 2024-10-02

## 2024-10-02 NOTE — PROGRESS NOTES
Dave Irene is a 59-year-old G2, P2 female who presented with PMB she underwent an ultrasound which revealed a 0.9 cm endometrial thickness and endometrial biopsy performed was insufficient because of the discordance she presents today to discuss hysteroscopy directed biopsy presents today with her .        Past Medical History:   Diagnosis Date    High cholesterol         Past Surgical History:   Procedure Laterality Date    NASAL SEPTUM SURGERY      TRACHEOTOMY      as child        Family History   Problem Relation Age of Onset    Heart Disease Mother     Heart Surgery Mother     High Cholesterol Father     Heart Disease Father     Kidney Disease Father     Heart Surgery Father     Hypertension Maternal Grandmother     Heart Disease Paternal Grandfather         Social History       Tobacco History       Smoking Status  Never      Smokeless Tobacco Use  Never              Alcohol History       Alcohol Use Status  Not Currently Drinks/Week  0 Glasses of wine, 0 Cans of beer, 0 Shots of liquor, 0 Drinks containing 0.5 oz of alcohol per week Comment  social              Drug Use       Drug Use Status  No              Sexual Activity       Sexually Active  Yes Partners  Male                      Current Outpatient Medications:     miSOPROStol (CYTOTEC) 100 MCG tablet, Take 2 tablets by mouth once for 1 dose Morning of surgery, Disp: 2 tablet, Rfl: 0    vitamin D (CHOLECALCIFEROL) 1000 UNIT TABS tablet, Take 1 tablet by mouth daily, Disp: , Rfl:     atorvastatin (LIPITOR) 10 MG tablet, Take 1 tablet by mouth daily, Disp: , Rfl:      Allergies   Allergen Reactions    Cephalosporins Shortness Of Breath    Penicillins Shortness Of Breath    Sulfa Antibiotics Rash        Vitals:    10/02/24 1529   BP: 109/69   Pulse: 68        Physical Exam:  No exam performed today.                                                          Dave was seen today for other.    Diagnoses and all orders for this visit:    PMB

## 2024-10-02 NOTE — PROGRESS NOTES
Patient alert and pleasant with no new complaints  Here today for pre-op appt.  At [facility]  All pre-op information given to patient.  Date,time to be determined  Patient voiced understanding.  Discharge instructions have been discussed with the patient. Patient advised to call our office with any questions or concerns.   Voiced understanding.

## 2024-10-04 ENCOUNTER — TELEPHONE (OUTPATIENT)
Dept: OBGYN | Age: 59
End: 2024-10-04

## 2024-10-04 NOTE — TELEPHONE ENCOUNTER
Surgery scheduled 11/6/24 @ 7:00 AM   [facility]  Pre-op appointment was already done  Patient made aware of surgery date and time.  Patient voiced understanding

## 2024-11-04 NOTE — PROGRESS NOTES
[facility] PRE-ADMISSION TESTING INSTRUCTIONS    The Preadmission Testing patient is instructed accordingly using the following criteria (check applicable):    ARRIVAL INSTRUCTIONS:  [x] Parking the day of Surgery is located in the Main Entrance lot.  Upon entering the door, make an immediate right to the surgery reception desk    [x] Bring photo ID and insurance card    [x] Please be sure to arrange for responsible adult to provide transportation to and from the hospital    [x] Please arrange for responsible adult to be with you for the 24 hour period post procedure due to having anesthesia    [x] If you awake am of surgery not feeling well or have temperature >100 please call [phone]    GENERAL INSTRUCTIONS:     x  Nothing to eat or drink after midnight prior to surgery date.          No gum, candy or mints.    [x] You may brush your teeth, but do not swallow any water    [x] Stop herbal supplements and vitamins 5 days prior to procedure    [x] Shower or bath with soap, lather and rinse well, AM of Surgery, no lotion, powders or creams     [x] No tobacco products within 24 hours of surgery     [x] No alcohol or illegal drug use within 24 hours of surgery.    [x] Jewelry, body piercing's, eyeglasses, contact lenses and dentures are not permitted into surgery (bring cases)      [x] Please do not wear any nail polish, make up or hair products on the day of surgery    [x] You can expect a call the business day prior to procedure to notify you if your arrival time changes    [x] If you receive a survey after surgery we would greatly appreciate your comments      [x] Please notify surgeon if you develop any illness between now and time of surgery (cold, cough, sore throat, fever, nausea, vomiting) or any signs of infections  including skin, wounds, and dental.    [x]  The Outpatient Pharmacy is available to fill your prescription here on your day of surgery, ask your preop nurse for

## 2024-11-05 ENCOUNTER — ANESTHESIA EVENT (OUTPATIENT)
Dept: OPERATING ROOM | Age: 59
End: 2024-11-05
Payer: COMMERCIAL

## 2024-11-05 ENCOUNTER — TELEPHONE (OUTPATIENT)
Dept: OBGYN | Age: 59
End: 2024-11-05

## 2024-11-05 NOTE — TELEPHONE ENCOUNTER
Patient called, states preadmission testing called her with surgery instructions and told her she should have stopped her vitamin d a week prior to surgery. Her last dose was on 11/3/2024. Per Dr. Burton, this is ok. Notified patient.

## 2024-11-05 NOTE — TELEPHONE ENCOUNTER
Spoke with pre admission testing and surgery will not be held because of patient not stopping her VitD a week ago. Patient was notified by office surgery is still on and provider and PAT are in agreement

## 2024-11-05 NOTE — TELEPHONE ENCOUNTER
Patient notified with instructions on how to take the medication prescribed the morning of her surgery.

## 2024-11-05 NOTE — TELEPHONE ENCOUNTER
Patient is scheduled for a surgery with Dr. Burton.  She is calling asking for instructions regarding the medication she is supposed to take on the day of surgery.     She would like a call from Dr. Burton or one of her nurses.

## 2024-11-06 ENCOUNTER — PREP FOR PROCEDURE (OUTPATIENT)
Dept: OBGYN | Age: 59
End: 2024-11-06

## 2024-11-06 ENCOUNTER — ANESTHESIA (OUTPATIENT)
Dept: OPERATING ROOM | Age: 59
End: 2024-11-06
Payer: COMMERCIAL

## 2024-11-06 ENCOUNTER — HOSPITAL ENCOUNTER (OUTPATIENT)
Age: 59
Setting detail: OUTPATIENT SURGERY
Discharge: HOME | End: 2024-11-06
Attending: OBSTETRICS & GYNECOLOGY | Admitting: OBSTETRICS & GYNECOLOGY
Payer: COMMERCIAL

## 2024-11-06 VITALS
OXYGEN SATURATION: 95 % | BODY MASS INDEX: 27.38 KG/M2 | DIASTOLIC BLOOD PRESSURE: 73 MMHG | SYSTOLIC BLOOD PRESSURE: 129 MMHG | WEIGHT: 145 LBS | RESPIRATION RATE: 15 BRPM | HEIGHT: 61 IN | HEART RATE: 51 BPM | TEMPERATURE: 96.9 F

## 2024-11-06 DIAGNOSIS — N95.0 POSTMENOPAUSAL BLEEDING: ICD-10-CM

## 2024-11-06 PROCEDURE — 3700000000 HC ANESTHESIA ATTENDED CARE: Performed by: OBSTETRICS & GYNECOLOGY

## 2024-11-06 PROCEDURE — 58558 HYSTEROSCOPY BIOPSY: CPT | Performed by: OBSTETRICS & GYNECOLOGY

## 2024-11-06 PROCEDURE — 2580000003 HC RX 258

## 2024-11-06 PROCEDURE — 3700000001 HC ADD 15 MINUTES (ANESTHESIA): Performed by: OBSTETRICS & GYNECOLOGY

## 2024-11-06 PROCEDURE — 7100000011 HC PHASE II RECOVERY - ADDTL 15 MIN: Performed by: OBSTETRICS & GYNECOLOGY

## 2024-11-06 PROCEDURE — 2720000010 HC SURG SUPPLY STERILE: Performed by: OBSTETRICS & GYNECOLOGY

## 2024-11-06 PROCEDURE — 7100000010 HC PHASE II RECOVERY - FIRST 15 MIN: Performed by: OBSTETRICS & GYNECOLOGY

## 2024-11-06 PROCEDURE — 2500000003 HC RX 250 WO HCPCS: Performed by: OBSTETRICS & GYNECOLOGY

## 2024-11-06 PROCEDURE — 2500000003 HC RX 250 WO HCPCS

## 2024-11-06 PROCEDURE — 2709999900 HC NON-CHARGEABLE SUPPLY: Performed by: OBSTETRICS & GYNECOLOGY

## 2024-11-06 PROCEDURE — 3600000012 HC SURGERY LEVEL 2 ADDTL 15MIN: Performed by: OBSTETRICS & GYNECOLOGY

## 2024-11-06 PROCEDURE — 3600000002 HC SURGERY LEVEL 2 BASE: Performed by: OBSTETRICS & GYNECOLOGY

## 2024-11-06 PROCEDURE — 88305 TISSUE EXAM BY PATHOLOGIST: CPT

## 2024-11-06 PROCEDURE — 6360000002 HC RX W HCPCS

## 2024-11-06 RX ORDER — IBUPROFEN 600 MG/1
600 TABLET, FILM COATED ORAL EVERY 6 HOURS PRN
Status: CANCELLED | OUTPATIENT
Start: 2024-11-06

## 2024-11-06 RX ORDER — ACETAMINOPHEN 500 MG
1000 TABLET ORAL EVERY 8 HOURS PRN
Status: CANCELLED | OUTPATIENT
Start: 2024-11-06

## 2024-11-06 RX ORDER — SODIUM CHLORIDE 0.9 % (FLUSH) 0.9 %
5-40 SYRINGE (ML) INJECTION PRN
Status: CANCELLED | OUTPATIENT
Start: 2024-11-06

## 2024-11-06 RX ORDER — SODIUM CHLORIDE 0.9 % (FLUSH) 0.9 %
5-40 SYRINGE (ML) INJECTION EVERY 12 HOURS SCHEDULED
Status: CANCELLED | OUTPATIENT
Start: 2024-11-06

## 2024-11-06 RX ORDER — SODIUM CHLORIDE 9 MG/ML
INJECTION, SOLUTION INTRAVENOUS PRN
Status: CANCELLED | OUTPATIENT
Start: 2024-11-06

## 2024-11-06 RX ORDER — MIDAZOLAM HYDROCHLORIDE 1 MG/ML
INJECTION, SOLUTION INTRAMUSCULAR; INTRAVENOUS
Status: DISCONTINUED | OUTPATIENT
Start: 2024-11-06 | End: 2024-11-06 | Stop reason: SDUPTHER

## 2024-11-06 RX ORDER — FENTANYL CITRATE 50 UG/ML
INJECTION, SOLUTION INTRAMUSCULAR; INTRAVENOUS
Status: DISCONTINUED | OUTPATIENT
Start: 2024-11-06 | End: 2024-11-06 | Stop reason: SDUPTHER

## 2024-11-06 RX ORDER — IBUPROFEN 600 MG/1
600 TABLET, FILM COATED ORAL 4 TIMES DAILY PRN
Qty: 40 TABLET | Refills: 0 | Status: SHIPPED | OUTPATIENT
Start: 2024-11-06

## 2024-11-06 RX ORDER — ONDANSETRON 4 MG/1
4 TABLET, ORALLY DISINTEGRATING ORAL EVERY 8 HOURS PRN
Status: CANCELLED | OUTPATIENT
Start: 2024-11-06

## 2024-11-06 RX ORDER — ONDANSETRON 2 MG/ML
INJECTION INTRAMUSCULAR; INTRAVENOUS
Status: DISCONTINUED | OUTPATIENT
Start: 2024-11-06 | End: 2024-11-06 | Stop reason: SDUPTHER

## 2024-11-06 RX ORDER — PROPOFOL 10 MG/ML
INJECTION, EMULSION INTRAVENOUS
Status: DISCONTINUED | OUTPATIENT
Start: 2024-11-06 | End: 2024-11-06 | Stop reason: SDUPTHER

## 2024-11-06 RX ORDER — PROCHLORPERAZINE EDISYLATE 5 MG/ML
5 INJECTION INTRAMUSCULAR; INTRAVENOUS
Status: CANCELLED | OUTPATIENT
Start: 2024-11-06 | End: 2024-11-07

## 2024-11-06 RX ORDER — LIDOCAINE HYDROCHLORIDE 10 MG/ML
INJECTION, SOLUTION EPIDURAL; INFILTRATION; INTRACAUDAL; PERINEURAL PRN
Status: DISCONTINUED | OUTPATIENT
Start: 2024-11-06 | End: 2024-11-06 | Stop reason: ALTCHOICE

## 2024-11-06 RX ORDER — DEXAMETHASONE SODIUM PHOSPHATE 4 MG/ML
INJECTION, SOLUTION INTRA-ARTICULAR; INTRALESIONAL; INTRAMUSCULAR; INTRAVENOUS; SOFT TISSUE
Status: DISCONTINUED | OUTPATIENT
Start: 2024-11-06 | End: 2024-11-06 | Stop reason: SDUPTHER

## 2024-11-06 RX ORDER — SODIUM CHLORIDE, SODIUM LACTATE, POTASSIUM CHLORIDE, CALCIUM CHLORIDE 600; 310; 30; 20 MG/100ML; MG/100ML; MG/100ML; MG/100ML
INJECTION, SOLUTION INTRAVENOUS CONTINUOUS
Status: CANCELLED | OUTPATIENT
Start: 2024-11-06

## 2024-11-06 RX ORDER — ONDANSETRON 2 MG/ML
4 INJECTION INTRAMUSCULAR; INTRAVENOUS EVERY 6 HOURS PRN
Status: CANCELLED | OUTPATIENT
Start: 2024-11-06

## 2024-11-06 RX ORDER — SODIUM CHLORIDE 9 MG/ML
INJECTION, SOLUTION INTRAVENOUS
Status: DISCONTINUED | OUTPATIENT
Start: 2024-11-06 | End: 2024-11-06 | Stop reason: SDUPTHER

## 2024-11-06 RX ORDER — LIDOCAINE HYDROCHLORIDE 20 MG/ML
INJECTION, SOLUTION EPIDURAL; INFILTRATION; INTRACAUDAL; PERINEURAL
Status: DISCONTINUED | OUTPATIENT
Start: 2024-11-06 | End: 2024-11-06 | Stop reason: SDUPTHER

## 2024-11-06 RX ORDER — NALOXONE HYDROCHLORIDE 0.4 MG/ML
INJECTION, SOLUTION INTRAMUSCULAR; INTRAVENOUS; SUBCUTANEOUS PRN
Status: CANCELLED | OUTPATIENT
Start: 2024-11-06

## 2024-11-06 RX ADMIN — SODIUM CHLORIDE: 9 INJECTION, SOLUTION INTRAVENOUS at 06:37

## 2024-11-06 RX ADMIN — PROPOFOL 140 MCG/KG/MIN: 10 INJECTION, EMULSION INTRAVENOUS at 07:09

## 2024-11-06 RX ADMIN — MIDAZOLAM 2 MG: 1 INJECTION INTRAMUSCULAR; INTRAVENOUS at 07:05

## 2024-11-06 RX ADMIN — PROPOFOL 50 MG: 10 INJECTION, EMULSION INTRAVENOUS at 07:08

## 2024-11-06 RX ADMIN — ONDANSETRON 4 MG: 2 INJECTION INTRAMUSCULAR; INTRAVENOUS at 07:30

## 2024-11-06 RX ADMIN — DEXAMETHASONE SODIUM PHOSPHATE 4 MG: 4 INJECTION, SOLUTION INTRAMUSCULAR; INTRAVENOUS at 07:11

## 2024-11-06 RX ADMIN — FENTANYL CITRATE 50 MCG: 50 INJECTION, SOLUTION INTRAMUSCULAR; INTRAVENOUS at 07:08

## 2024-11-06 RX ADMIN — LIDOCAINE HYDROCHLORIDE 50 MG: 20 INJECTION, SOLUTION EPIDURAL; INFILTRATION; INTRACAUDAL; PERINEURAL at 07:08

## 2024-11-06 ASSESSMENT — PAIN - FUNCTIONAL ASSESSMENT: PAIN_FUNCTIONAL_ASSESSMENT: 0-10

## 2024-11-06 NOTE — OP NOTE
Operative Note      Patient: Dave Irene  YOB: 1965  MRN: [id number]    Date of Procedure: 11/6/2024    Pre-Op Diagnosis Codes:      * Postmenopausal bleeding [N95.0]    Post-Op Diagnosis: Same with 4 endometrial polyps pathology pending       Procedure(s):  HYSTEROSCOPY DIRECTED BIOPSY WITH MYOSURE resection of polyps    Surgeon(s):  Hayley Burton DO    Assistant:   None    Anesthesia: Monitor Anesthesia Care with local    Estimated Blood Loss (mL): Minimal    Complications: None    Specimens:   ID Type Source Tests Collected by Time Destination   1 :  Tissue Tissue  Hayley Burton DO 11/6/2024 0731        Implants:  * No implants in log *      Drains: * No LDAs found *    Findings:  Infection Present At Time Of Surgery (PATOS) (choose all levels that have infection present):  No infection present  Other Findings: Endometrial polyp    Detailed Description of Procedure:   Dave was identified in the preop area her history was updated.  She was then taken to the operating room and again correctly identified.  Anesthesia was administered.  She was then placed in dorsal lithotomy position prepped and draped in usual fashion.  A pause took place to identify the patient the procedure and that all instruments were present.  Franco speculum was placed in the vaginal vault the cervix was grasped with a single-tooth tenaculum and the 4 and 8:00 positions were infiltrated with a total of 10 mL of lidocaine.  Cervix was then carefully dilated and the MyoSure scope was inserted into the endometrial cavity.  Once the uterus was insufflated it was noted to be at least 4 polyps and they were resected.  At the termination both tubal ostia were visualized the remainder of the endometrial cavity was atrophic.  Tenaculum site was hemostatic there was no active bleeding.  The patient was awakened and sent to recovery room stable.  All sponge and instrument counts were correct.    Electronically signed by

## 2024-11-06 NOTE — ANESTHESIA POSTPROCEDURE EVALUATION
Department of Anesthesiology  Postprocedure Note    Patient: Dave Irene  MRN: [id number]  YOB: 1965  Date of evaluation: 11/6/2024    Procedure Summary       Date: 11/06/24 Room / Location: [address]    Anesthesia Start: 0704 Anesthesia Stop: 0747    Procedure: HYSTEROSCOPY DIRECTED BIOPSY WITH MYOSURE (Vagina ) Diagnosis:       Postmenopausal bleeding      (Postmenopausal bleeding [N95.0])    Surgeons: Hayley Burton DO Responsible Provider: Dawna Baxter MD    Anesthesia Type: MAC ASA Status: 2            Anesthesia Type: MAC    Denny Phase I: Denny Score: 10    Denny Phase II: Denny Score: 10    Anesthesia Post Evaluation    Patient location during evaluation: PACU  Patient participation: complete - patient participated  Level of consciousness: awake and alert  Pain score: 0  Airway patency: patent  Nausea & Vomiting: no nausea and no vomiting  Cardiovascular status: hemodynamically stable  Respiratory status: acceptable, nonlabored ventilation and spontaneous ventilation  Hydration status: stable  Pain management: adequate        No notable events documented.

## 2024-11-06 NOTE — H&P
Dave Irene is a 59 y.o. female patient.  With PMB.  EMB was performed with insufficient tissue ultrasound revealed thickened endometrium because of the discordance we are proceeding with hysteroscopy  Past Medical History:   Diagnosis Date    High cholesterol     PMB (postmenopausal bleeding)     endometrial thickening     OB History          2    Para   2    Term   2       0    AB   0    Living   2         SAB   0    IAB   0    Ectopic   0    Molar        Multiple   0    Live Births   2                Allergies   Allergen Reactions    Cephalosporins Shortness Of Breath    Penicillins Shortness Of Breath    Sulfa Antibiotics Rash         Last menstrual period 2015.    Exam  Heart regular  Lungs clear  Abdomen is soft nontender no guarding no rebound  Vulva without lesions  Cervix without lesions  Decreased vaginal rugae  No CMT  Assessment & Plan  PMB  Plan is to proceed with hysteroscopy directed biopsy reviewed the risks including anesthesia, bleeding, infection and uterine perforation to decrease the risk for uterine perforation we have given Cytotec for cervical ripening and the side effects of that medication were reviewed.  Hayley Burton DO  2024

## 2024-11-06 NOTE — ANESTHESIA PRE PROCEDURE
BP Readings from Last 3 Encounters:   10/02/24 109/69   09/24/24 111/69   08/29/24 125/78       NPO Status:                                                                                 BMI:   Wt Readings from Last 3 Encounters:   10/02/24 67.1 kg (148 lb)   09/24/24 66.2 kg (146 lb)   08/29/24 67 kg (147 lb 9.6 oz)     Body mass index is 27.59 kg/m².    CBC:   Lab Results   Component Value Date/Time    WBC 6.2 03/06/2020 06:18 AM    RBC 4.03 03/06/2020 06:18 AM    HGB 11.6 03/06/2020 06:18 AM    HCT 35.2 03/06/2020 06:18 AM    MCV 87.3 03/06/2020 06:18 AM    RDW 13.5 03/06/2020 06:18 AM     03/06/2020 06:18 AM       CMP:   Lab Results   Component Value Date/Time     03/06/2020 06:18 AM    K 3.7 03/06/2020 06:18 AM     03/06/2020 06:18 AM    CO2 25 03/06/2020 06:18 AM    BUN 15 03/06/2020 06:18 AM    CREATININE 0.7 03/06/2020 06:18 AM    GFRAA >60 03/06/2020 06:18 AM    LABGLOM >60 03/06/2020 06:18 AM    GLUCOSE 118 03/06/2020 06:18 AM    CALCIUM 9.0 03/06/2020 06:18 AM    BILITOT 0.3 03/06/2020 06:18 AM    ALKPHOS 74 03/06/2020 06:18 AM    AST 23 03/06/2020 06:18 AM    ALT 31 03/06/2020 06:18 AM       POC Tests: No results for input(s): \"POCGLU\", \"POCNA\", \"POCK\", \"POCCL\", \"POCBUN\", \"POCHEMO\", \"POCHCT\" in the last 72 hours.    Coags: No results found for: \"PROTIME\", \"INR\", \"APTT\"    HCG (If Applicable): No results found for: \"PREGTESTUR\", \"PREGSERUM\", \"HCG\", \"HCGQUANT\"     ABGs: No results found for: \"PHART\", \"PO2ART\", \"ZSO3PTW\", \"NXG3IBG\", \"BEART\", \"U9PUAWNA\"     Type & Screen (If Applicable):  No results found for: \"ABORH\", \"LABANTI\"    Drug/Infectious Status (If Applicable):  No results found for: \"HIV\", \"HEPCAB\"    COVID-19 Screening (If Applicable): No results found for: \"COVID19\"        Anesthesia Evaluation  Patient summary reviewed and Nursing notes reviewed   no history of anesthetic complications:   Airway: Mallampati: II  TM distance: >3 FB   Neck ROM: full  Mouth opening:

## 2024-11-08 LAB — SURGICAL PATHOLOGY REPORT: NORMAL

## 2024-11-12 ENCOUNTER — TELEPHONE (OUTPATIENT)
Dept: OBGYN | Age: 59
End: 2024-11-12

## 2024-11-12 NOTE — TELEPHONE ENCOUNTER
Notified of hysteroscopy polypectomy all benign.  She is doing well.  We did again review that the ultrasound endometrial thickness was indeed the polyps which were removed the endometrial tissue itself was atrophic as expected after menopause

## 2025-09-02 ENCOUNTER — OFFICE VISIT (OUTPATIENT)
Age: 60
End: 2025-09-02
Payer: COMMERCIAL

## 2025-09-02 VITALS
TEMPERATURE: 98.5 F | HEART RATE: 56 BPM | DIASTOLIC BLOOD PRESSURE: 71 MMHG | BODY MASS INDEX: 27.78 KG/M2 | SYSTOLIC BLOOD PRESSURE: 110 MMHG | WEIGHT: 147 LBS | OXYGEN SATURATION: 94 %

## 2025-09-02 DIAGNOSIS — Z01.419 ENCOUNTER FOR ANNUAL ROUTINE GYNECOLOGICAL EXAMINATION: Primary | ICD-10-CM

## 2025-09-02 DIAGNOSIS — Z12.31 ENCOUNTER FOR SCREENING MAMMOGRAM FOR MALIGNANT NEOPLASM OF BREAST: ICD-10-CM

## 2025-09-02 PROCEDURE — 99396 PREV VISIT EST AGE 40-64: CPT | Performed by: OBSTETRICS & GYNECOLOGY

## 2025-09-02 SDOH — ECONOMIC STABILITY: FOOD INSECURITY: WITHIN THE PAST 12 MONTHS, THE FOOD YOU BOUGHT JUST DIDN'T LAST AND YOU DIDN'T HAVE MONEY TO GET MORE.: NEVER TRUE

## 2025-09-02 SDOH — ECONOMIC STABILITY: FOOD INSECURITY: WITHIN THE PAST 12 MONTHS, YOU WORRIED THAT YOUR FOOD WOULD RUN OUT BEFORE YOU GOT MONEY TO BUY MORE.: NEVER TRUE

## 2025-09-02 ASSESSMENT — ENCOUNTER SYMPTOMS
VOMITING: 0
DIARRHEA: 0
SHORTNESS OF BREATH: 0
CONSTIPATION: 0
WHEEZING: 0
BLOOD IN STOOL: 0
NAUSEA: 0
COUGH: 0
ABDOMINAL PAIN: 0

## 2025-09-02 ASSESSMENT — PATIENT HEALTH QUESTIONNAIRE - PHQ9
SUM OF ALL RESPONSES TO PHQ QUESTIONS 1-9: 0
2. FEELING DOWN, DEPRESSED OR HOPELESS: NOT AT ALL
1. LITTLE INTEREST OR PLEASURE IN DOING THINGS: NOT AT ALL
SUM OF ALL RESPONSES TO PHQ QUESTIONS 1-9: 0

## (undated) DEVICE — TOWEL,OR,DSP,ST,BLUE,STD,6/PK,12PK/CS: Brand: MEDLINE

## (undated) DEVICE — DRAPE,REIN 53X77,STERILE: Brand: MEDLINE

## (undated) DEVICE — LEGGINGS, PAIR, 31X48, STERILE: Brand: MEDLINE

## (undated) DEVICE — VAGINAL PREP TRAY: Brand: MEDLINE INDUSTRIES, INC.

## (undated) DEVICE — 4-PORT MANIFOLD: Brand: NEPTUNE 2

## (undated) DEVICE — COVER,LIGHT HANDLE,FLX,2/PK: Brand: MEDLINE INDUSTRIES, INC.

## (undated) DEVICE — NEEDLE SPNL L3.5IN PNK HUB S STL REG WALL FIT STYL W/ QNCKE

## (undated) DEVICE — GAUZE,SPONGE,4"X4",16PLY,XRAY,STRL,LF: Brand: MEDLINE

## (undated) DEVICE — GOWN,SIRUS,FABRNF,XL,20/CS: Brand: MEDLINE

## (undated) DEVICE — GLOVE SURG SZ 65 THK91MIL LTX FREE SYN POLYISOPRENE

## (undated) DEVICE — GOWN,SIRUS,POLYRNF,BRTHSLV,XLN/XL,20/CS: Brand: MEDLINE

## (undated) DEVICE — 3000 CC, SUCTION CANISTER: Brand: FLUID SAFE

## (undated) DEVICE — DOUBLE BASIN SET: Brand: MEDLINE INDUSTRIES, INC.

## (undated) DEVICE — MARKER,SKIN,WI/RULER AND LABELS: Brand: MEDLINE

## (undated) DEVICE — GLOVE SURG SZ 65 L12IN FNGR THK94MIL STD WHT LTX FREE

## (undated) DEVICE — SOLUTION IRRIG 3000ML 0.9% SOD CHL USP UROMATIC PLAS CONT

## (undated) DEVICE — PAD,NON-ADHERENT,3X8,STERILE,LF,1/PK: Brand: MEDLINE

## (undated) DEVICE — DEVICE TISS REM DIA3MM L25.25IN ENDOSCP F/ IU POLYPS

## (undated) DEVICE — SYRINGE, LUER LOCK, 10ML: Brand: MEDLINE

## (undated) DEVICE — SET FLD COLL CLR W/ BLT IN WARN SYS FOR HYSTSCP DOLPHIN

## (undated) DEVICE — FLUID MANAGEMENT SYSTEM, INTEGRATED TUBE SET, DO NOT USE IF PACKAGE IS DAMAGED, KEEP DRY, KEEP AWAY FROM SUNLIGHT, KEEP AWAY FROM HEAT AND RADIOACTIVE SOURCES: Brand: FLUID SAFE

## (undated) DEVICE — PAD,SANITARY,11 IN,MAXI,N-STRL,IND WRAP: Brand: MEDLINE